# Patient Record
Sex: FEMALE | Race: WHITE
[De-identification: names, ages, dates, MRNs, and addresses within clinical notes are randomized per-mention and may not be internally consistent; named-entity substitution may affect disease eponyms.]

---

## 2018-01-24 ENCOUNTER — HOSPITAL ENCOUNTER (INPATIENT)
Dept: HOSPITAL 53 - M PCU | Age: 68
LOS: 2 days | Discharge: HOME | DRG: 194 | End: 2018-01-26
Attending: INTERNAL MEDICINE | Admitting: GENERAL PRACTICE
Payer: MEDICARE

## 2018-01-24 DIAGNOSIS — K44.9: ICD-10-CM

## 2018-01-24 DIAGNOSIS — I10: ICD-10-CM

## 2018-01-24 DIAGNOSIS — Z87.891: ICD-10-CM

## 2018-01-24 DIAGNOSIS — E03.9: ICD-10-CM

## 2018-01-24 DIAGNOSIS — G47.33: ICD-10-CM

## 2018-01-24 DIAGNOSIS — E66.01: ICD-10-CM

## 2018-01-24 DIAGNOSIS — J45.901: ICD-10-CM

## 2018-01-24 DIAGNOSIS — E11.9: ICD-10-CM

## 2018-01-24 DIAGNOSIS — E78.5: ICD-10-CM

## 2018-01-24 DIAGNOSIS — J10.1: Primary | ICD-10-CM

## 2018-01-24 DIAGNOSIS — Z79.899: ICD-10-CM

## 2018-01-24 DIAGNOSIS — Z88.2: ICD-10-CM

## 2018-01-24 DIAGNOSIS — I48.0: ICD-10-CM

## 2018-01-24 DIAGNOSIS — Z88.5: ICD-10-CM

## 2018-01-24 DIAGNOSIS — Z88.8: ICD-10-CM

## 2018-01-24 LAB
ABG BASE EXCESS: 0.3 (ref -2–2)
ABG HCO3: 24 MEQ/L (ref 22–26)
ABG O2 SATURATION: 99.5 % (ref 95–99)
ABG PARTIAL PRESSURE CO2: 35.5 MMHG (ref 35–45)
ABG PARTIAL PRESSURE O2: 266.4 MMHG (ref 75–100)
ABG PH (ARTERIAL): 7.45 UNITS (ref 7.35–7.45)
ABG STANDARD HCO3: 24.8 MEQ/L (ref 22–26)
ABG TOTAL CO2: 25 MEQ/L (ref 23–31)
ALBUMIN/GLOBULIN RATIO: 0.95 (ref 1–1.93)
ALBUMIN: 3.7 GM/DL (ref 3.2–5.2)
ALKALINE PHOSPHATASE: 48 U/L (ref 45–117)
ALT SERPL W P-5'-P-CCNC: 39 U/L (ref 12–78)
ANION GAP: 8 MEQ/L (ref 8–16)
AST SERPL-CCNC: 41 U/L (ref 7–37)
BASO #: 0.1 10^3/UL (ref 0–0.2)
BASO %: 0.9 % (ref 0–1)
BILIRUB CONJ SERPL-MCNC: < 0.1 MG/DL (ref 0–0.2)
BILIRUBIN,TOTAL: 0.1 MG/DL (ref 0.2–1)
BLOOD UREA NITROGEN: 15 MG/DL (ref 7–18)
CALCIUM LEVEL: 8.5 MG/DL (ref 8.8–10.2)
CARBON DIOXIDE LEVEL: 25 MEQ/L (ref 21–32)
CHLORIDE LEVEL: 105 MEQ/L (ref 98–107)
CK MB CFR.DF SERPL CALC: 0.37
CK MB CFR.DF SERPL CALC: 0.59
CK SERPL-CCNC: 217 U/L (ref 26–192)
CK SERPL-CCNC: 266 U/L (ref 26–192)
CK-MB VALUE MASS: 1 NG/ML (ref 0–3.6)
CK-MB VALUE MASS: 1.3 NG/ML (ref 0–3.6)
CREATININE FOR GFR: 0.86 MG/DL (ref 0.55–1.02)
EOS #: 0.1 10^3/UL (ref 0–0.5)
EOSINOPHIL NFR BLD AUTO: 1.4 % (ref 0–3)
FREE T4: 1.36 NG/DL (ref 0.76–1.46)
GFR SERPL CREATININE-BSD FRML MDRD: > 60 ML/MIN/{1.73_M2} (ref 45–?)
GLUCOSE BLDC GLUCOMTR-MCNC: 223 MG/DL (ref 80–115)
GLUCOSE, FASTING: 147 MG/DL (ref 70–100)
HEMATOCRIT: 37.2 % (ref 36–47)
HEMOGLOBIN: 12.6 G/DL (ref 12–16)
IMMATURE GRANULOCYTE #: 0 10^3/UL (ref 0–0)
IMMATURE GRANULOCYTE %: 0.3 % (ref 0–0)
INR: 1.04
LIPASE: 125 U/L (ref 73–393)
LYMPH #: 1.5 10^3/UL (ref 1.5–4.5)
LYMPH %: 22.2 % (ref 24–44)
MAGNESIUM LEVEL: 2.2 MG/DL (ref 1.8–2.4)
MEAN CORPUSCULAR HEMOGLOBIN: 28.3 PG (ref 27–33)
MEAN CORPUSCULAR HGB CONC: 33.9 G/DL (ref 32–36.5)
MEAN CORPUSCULAR VOLUME: 83.4 FL (ref 80–96)
MONO #: 1.1 10^3/UL (ref 0–0.8)
MONO %: 15.9 % (ref 0–5)
NEUTROPHILS #: 4.1 10^3/UL (ref 1.8–7.7)
NEUTROPHILS %: 59.3 % (ref 36–66)
NRBC BLD AUTO-RTO: 0 % (ref 0–0)
NT-PRO BNP: 269 PG/ML (ref ?–125)
PARTIAL THROMBOPLASTIN TIME: 32.4 SECONDS (ref 26.8–37.9)
PHOSPHORUS LEVEL: 3 MG/DL (ref 2.5–4.9)
PLATELET COUNT, AUTOMATED: 264 10^3/UL (ref 150–450)
POTASSIUM SERUM: 4.2 MEQ/L (ref 3.5–5.1)
PROTHROMBIN TIME: 13.7 SECONDS (ref 12.4–14.5)
RED BLOOD COUNT: 4.46 10^6/UL (ref 4–5.4)
RED CELL DISTRIBUTION WIDTH: 14.4 % (ref 11.5–14.5)
SODIUM LEVEL: 138 MEQ/L (ref 136–145)
THYROID STIMULATING HORMONE: 1.26 UIU/ML (ref 0.36–3.74)
TOTAL PROTEIN: 7.6 GM/DL (ref 6.4–8.2)
TROPONIN I: < 0.02 NG/ML (ref ?–0.1)
TROPONIN I: < 0.02 NG/ML (ref ?–0.1)
WHITE BLOOD COUNT: 6.9 10^3/UL (ref 4–10)

## 2018-01-24 RX ADMIN — ACETAMINOPHEN 1 MG: 325 TABLET ORAL at 22:01

## 2018-01-24 RX ADMIN — IPRATROPIUM BROMIDE AND ALBUTEROL SULFATE 1 ML: .5; 3 SOLUTION RESPIRATORY (INHALATION) at 16:52

## 2018-01-24 RX ADMIN — RIVAROXABAN 1 MG: 20 TABLET, FILM COATED ORAL at 23:24

## 2018-01-24 RX ADMIN — METHYLPREDNISOLONE SODIUM SUCCINATE 1 MG: 125 INJECTION, POWDER, FOR SOLUTION INTRAMUSCULAR; INTRAVENOUS at 17:19

## 2018-01-24 RX ADMIN — INSULIN LISPRO 1 UNITS: 100 INJECTION, SOLUTION INTRAVENOUS; SUBCUTANEOUS at 21:00

## 2018-01-24 RX ADMIN — FUROSEMIDE 1 MG: 10 INJECTION, SOLUTION INTRAMUSCULAR; INTRAVENOUS at 23:52

## 2018-01-24 RX ADMIN — ASPIRIN 325 MG ORAL TABLET 1 MG: 325 PILL ORAL at 18:35

## 2018-01-24 RX ADMIN — IPRATROPIUM BROMIDE AND ALBUTEROL SULFATE 1 ML: .5; 3 SOLUTION RESPIRATORY (INHALATION) at 21:34

## 2018-01-25 LAB
ANION GAP: 10 MEQ/L (ref 8–16)
BASO #: 0 10^3/UL (ref 0–0.2)
BASO %: 0.1 % (ref 0–1)
BLOOD UREA NITROGEN: 18 MG/DL (ref 7–18)
CALCIUM LEVEL: 8.9 MG/DL (ref 8.8–10.2)
CARBON DIOXIDE LEVEL: 23 MEQ/L (ref 21–32)
CHLORIDE LEVEL: 104 MEQ/L (ref 98–107)
CK MB CFR.DF SERPL CALC: 0.57
CK MB CFR.DF SERPL CALC: 0.74
CK SERPL-CCNC: 174 U/L (ref 26–192)
CK SERPL-CCNC: 189 U/L (ref 26–192)
CK-MB VALUE MASS: 1 NG/ML (ref 0–3.6)
CK-MB VALUE MASS: 1.4 NG/ML (ref 0–3.6)
CREATININE FOR GFR: 0.87 MG/DL (ref 0.55–1.02)
EOS #: 0 10^3/UL (ref 0–0.5)
EOSINOPHIL NFR BLD AUTO: 0 % (ref 0–3)
EST. AVERAGE GLUCOSE BLD GHB EST-MCNC: 146 MG/DL (ref 60–110)
GFR SERPL CREATININE-BSD FRML MDRD: > 60 ML/MIN/{1.73_M2} (ref 45–?)
GLUCOSE BLDC GLUCOMTR-MCNC: 171 MG/DL (ref 80–115)
GLUCOSE BLDC GLUCOMTR-MCNC: 172 MG/DL (ref 80–115)
GLUCOSE BLDC GLUCOMTR-MCNC: 198 MG/DL (ref 80–115)
GLUCOSE, FASTING: 211 MG/DL (ref 70–100)
HEMATOCRIT: 38 % (ref 36–47)
HEMOGLOBIN: 12.6 G/DL (ref 12–16)
IMMATURE GRANULOCYTE #: 0.1 10^3/UL (ref 0–0)
IMMATURE GRANULOCYTE %: 0.8 % (ref 0–0)
LYMPH #: 0.9 10^3/UL (ref 1.5–4.5)
LYMPH %: 12.9 % (ref 24–44)
MEAN CORPUSCULAR HEMOGLOBIN: 27.6 PG (ref 27–33)
MEAN CORPUSCULAR HGB CONC: 33.2 G/DL (ref 32–36.5)
MEAN CORPUSCULAR VOLUME: 83.2 FL (ref 80–96)
MONO #: 0.1 10^3/UL (ref 0–0.8)
MONO %: 1.4 % (ref 0–5)
NEUTROPHILS #: 6.1 10^3/UL (ref 1.8–7.7)
NEUTROPHILS %: 84.8 % (ref 36–66)
NRBC BLD AUTO-RTO: 0 % (ref 0–0)
PLATELET COUNT, AUTOMATED: 274 10^3/UL (ref 150–450)
POTASSIUM SERUM: 3.9 MEQ/L (ref 3.5–5.1)
RED BLOOD COUNT: 4.57 10^6/UL (ref 4–5.4)
RED CELL DISTRIBUTION WIDTH: 14.5 % (ref 11.5–14.5)
SODIUM LEVEL: 137 MEQ/L (ref 136–145)
TROPONIN I: < 0.02 NG/ML (ref ?–0.1)
TROPONIN I: < 0.02 NG/ML (ref ?–0.1)
WHITE BLOOD COUNT: 7.2 10^3/UL (ref 4–10)

## 2018-01-25 RX ADMIN — LEVALBUTEROL HYDROCHLORIDE 1 MG: 1.25 SOLUTION, CONCENTRATE RESPIRATORY (INHALATION) at 00:58

## 2018-01-25 RX ADMIN — LEVALBUTEROL HYDROCHLORIDE 1 MG: 1.25 SOLUTION, CONCENTRATE RESPIRATORY (INHALATION) at 20:00

## 2018-01-25 RX ADMIN — OSELTAMIVIR PHOSPHATE 1 MG: 30 CAPSULE ORAL at 20:43

## 2018-01-25 RX ADMIN — METHYLPREDNISOLONE SODIUM SUCCINATE 1 MG: 40 INJECTION, POWDER, FOR SOLUTION INTRAMUSCULAR; INTRAVENOUS at 13:00

## 2018-01-25 RX ADMIN — INSULIN LISPRO 4 UNITS: 100 INJECTION, SOLUTION INTRAVENOUS; SUBCUTANEOUS at 18:08

## 2018-01-25 RX ADMIN — INSULIN LISPRO 4 UNITS: 100 INJECTION, SOLUTION INTRAVENOUS; SUBCUTANEOUS at 12:00

## 2018-01-25 RX ADMIN — FUROSEMIDE 1 MG: 20 TABLET ORAL at 10:25

## 2018-01-25 RX ADMIN — LOSARTAN POTASSIUM 1 MG: 50 TABLET, FILM COATED ORAL at 10:26

## 2018-01-25 RX ADMIN — BUDESONIDE AND FORMOTEROL FUMARATE DIHYDRATE 1 PUFF: 80; 4.5 AEROSOL RESPIRATORY (INHALATION) at 21:22

## 2018-01-25 RX ADMIN — LEVALBUTEROL HYDROCHLORIDE 1 MG: 1.25 SOLUTION, CONCENTRATE RESPIRATORY (INHALATION) at 08:18

## 2018-01-25 RX ADMIN — INSULIN LISPRO 1 UNITS: 100 INJECTION, SOLUTION INTRAVENOUS; SUBCUTANEOUS at 20:47

## 2018-01-25 RX ADMIN — OMEPRAZOLE 1 MG: 20 CAPSULE, DELAYED RELEASE ORAL at 10:26

## 2018-01-25 RX ADMIN — BUDESONIDE AND FORMOTEROL FUMARATE DIHYDRATE 1 PUFF: 80; 4.5 AEROSOL RESPIRATORY (INHALATION) at 10:03

## 2018-01-25 RX ADMIN — ASPIRIN 1 MG: 81 TABLET ORAL at 10:25

## 2018-01-25 RX ADMIN — Medication 1 EA: at 10:24

## 2018-01-25 RX ADMIN — OSELTAMIVIR PHOSPHATE 1 MG: 30 CAPSULE ORAL at 09:00

## 2018-01-25 RX ADMIN — PRAVASTATIN SODIUM 1 MG: 20 TABLET ORAL at 20:43

## 2018-01-25 RX ADMIN — METHYLPREDNISOLONE SODIUM SUCCINATE 1 MG: 125 INJECTION, POWDER, FOR SOLUTION INTRAMUSCULAR; INTRAVENOUS at 10:00

## 2018-01-25 RX ADMIN — INSULIN LISPRO 6 UNITS: 100 INJECTION, SOLUTION INTRAVENOUS; SUBCUTANEOUS at 10:23

## 2018-01-25 RX ADMIN — Medication 1 EA: at 20:43

## 2018-01-25 RX ADMIN — RIVAROXABAN 1 MG: 20 TABLET, FILM COATED ORAL at 20:43

## 2018-01-25 RX ADMIN — LEVOTHYROXINE SODIUM 1 MCG: 150 TABLET ORAL at 06:38

## 2018-01-25 RX ADMIN — ALLOPURINOL 1 MG: 100 TABLET ORAL at 10:24

## 2018-01-25 RX ADMIN — METHYLPREDNISOLONE SODIUM SUCCINATE 1 MG: 125 INJECTION, POWDER, FOR SOLUTION INTRAMUSCULAR; INTRAVENOUS at 02:12

## 2018-01-26 LAB
ANION GAP: 9 MEQ/L (ref 8–16)
BASO #: 0 10^3/UL (ref 0–0.2)
BASO %: 0.1 % (ref 0–1)
BLOOD UREA NITROGEN: 24 MG/DL (ref 7–18)
CALCIUM LEVEL: 8.9 MG/DL (ref 8.8–10.2)
CARBON DIOXIDE LEVEL: 24 MEQ/L (ref 21–32)
CHLORIDE LEVEL: 106 MEQ/L (ref 98–107)
CREATININE FOR GFR: 0.86 MG/DL (ref 0.55–1.02)
EOS #: 0 10^3/UL (ref 0–0.5)
EOSINOPHIL NFR BLD AUTO: 0 % (ref 0–3)
GFR SERPL CREATININE-BSD FRML MDRD: > 60 ML/MIN/{1.73_M2} (ref 45–?)
GLUCOSE, FASTING: 204 MG/DL (ref 70–100)
HEMATOCRIT: 37.9 % (ref 36–47)
HEMOGLOBIN: 12.5 G/DL (ref 12–16)
IMMATURE GRANULOCYTE #: 0.2 10^3/UL (ref 0–0)
IMMATURE GRANULOCYTE %: 0.9 % (ref 0–0)
LYMPH #: 1.3 10^3/UL (ref 1.5–4.5)
LYMPH %: 7.2 % (ref 24–44)
MEAN CORPUSCULAR HEMOGLOBIN: 27.7 PG (ref 27–33)
MEAN CORPUSCULAR HGB CONC: 33 G/DL (ref 32–36.5)
MEAN CORPUSCULAR VOLUME: 83.8 FL (ref 80–96)
MONO #: 0.6 10^3/UL (ref 0–0.8)
MONO %: 3.3 % (ref 0–5)
NEUTROPHILS #: 16.1 10^3/UL (ref 1.8–7.7)
NEUTROPHILS %: 88.5 % (ref 36–66)
NRBC BLD AUTO-RTO: 0 % (ref 0–0)
PLATELET COUNT, AUTOMATED: 283 10^3/UL (ref 150–450)
POTASSIUM SERUM: 4.3 MEQ/L (ref 3.5–5.1)
RED BLOOD COUNT: 4.52 10^6/UL (ref 4–5.4)
RED CELL DISTRIBUTION WIDTH: 14.7 % (ref 11.5–14.5)
SODIUM LEVEL: 139 MEQ/L (ref 136–145)
WHITE BLOOD COUNT: 18.2 10^3/UL (ref 4–10)

## 2018-01-26 RX ADMIN — ALLOPURINOL 1 MG: 100 TABLET ORAL at 08:09

## 2018-01-26 RX ADMIN — LEVALBUTEROL HYDROCHLORIDE 1 MG: 1.25 SOLUTION, CONCENTRATE RESPIRATORY (INHALATION) at 01:46

## 2018-01-26 RX ADMIN — OMEPRAZOLE 1 MG: 20 CAPSULE, DELAYED RELEASE ORAL at 08:09

## 2018-01-26 RX ADMIN — OSELTAMIVIR PHOSPHATE 1 MG: 30 CAPSULE ORAL at 08:09

## 2018-01-26 RX ADMIN — METHYLPREDNISOLONE SODIUM SUCCINATE 1 MG: 40 INJECTION, POWDER, FOR SOLUTION INTRAMUSCULAR; INTRAVENOUS at 00:20

## 2018-01-26 RX ADMIN — LEVOTHYROXINE SODIUM 1 MCG: 150 TABLET ORAL at 05:39

## 2018-01-26 RX ADMIN — ASPIRIN 1 MG: 81 TABLET ORAL at 08:08

## 2018-01-26 RX ADMIN — INSULIN LISPRO 6 UNITS: 100 INJECTION, SOLUTION INTRAVENOUS; SUBCUTANEOUS at 08:17

## 2018-01-26 RX ADMIN — BUDESONIDE AND FORMOTEROL FUMARATE DIHYDRATE 1 PUFF: 80; 4.5 AEROSOL RESPIRATORY (INHALATION) at 08:21

## 2018-01-26 RX ADMIN — FUROSEMIDE 1 MG: 20 TABLET ORAL at 08:09

## 2018-01-26 RX ADMIN — Medication 1 EA: at 08:08

## 2018-01-26 RX ADMIN — LOSARTAN POTASSIUM 1 MG: 50 TABLET, FILM COATED ORAL at 08:15

## 2018-01-26 RX ADMIN — LEVALBUTEROL HYDROCHLORIDE 1 MG: 1.25 SOLUTION, CONCENTRATE RESPIRATORY (INHALATION) at 08:00

## 2018-04-20 ENCOUNTER — HOSPITAL ENCOUNTER (OUTPATIENT)
Dept: HOSPITAL 53 - M SMT | Age: 68
End: 2018-04-20
Attending: ALLERGY & IMMUNOLOGY
Payer: MEDICARE
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
## 2018-04-20 DIAGNOSIS — T78.3XXA: Primary | ICD-10-CM

## 2018-04-20 LAB
ALBUMIN/GLOBULIN RATIO: 1.03 (ref 1–1.93)
ALBUMIN: 3.8 GM/DL (ref 3.2–5.2)
ALKALINE PHOSPHATASE: 47 U/L (ref 45–117)
ALT SERPL W P-5'-P-CCNC: 42 U/L (ref 12–78)
ANION GAP: 13 MEQ/L (ref 8–16)
AST SERPL-CCNC: 29 U/L (ref 7–37)
BILIRUBIN,TOTAL: 0.2 MG/DL (ref 0.2–1)
BLOOD UREA NITROGEN: 15 MG/DL (ref 7–18)
CALCIUM LEVEL: 9.1 MG/DL (ref 8.8–10.2)
CARBON DIOXIDE LEVEL: 21 MEQ/L (ref 21–32)
CHLORIDE LEVEL: 108 MEQ/L (ref 98–107)
COMPLEMENT C4: 27.5 MG/DL (ref 10–40)
CREATININE FOR GFR: 0.85 MG/DL (ref 0.55–1.3)
ERYTHROCYTE SEDIMENTATION RATE: 23 MM/HR (ref 0–30)
GFR SERPL CREATININE-BSD FRML MDRD: > 60 ML/MIN/{1.73_M2} (ref 45–?)
GLUCOSE, FASTING: 106 MG/DL (ref 70–100)
POTASSIUM SERUM: 4 MEQ/L (ref 3.5–5.1)
RHEUMATOID FACTOR QUANT: < 10 IU/ML (ref ?–15)
SODIUM LEVEL: 142 MEQ/L (ref 136–145)
THYROGLOBULIN ANTIBODY: 44.2 U/ML (ref ?–60)
THYROPEROXIDASE AB SERPL IA-ACNC: 1054.7 U/ML (ref ?–60)
TOTAL PROTEIN: 7.5 GM/DL (ref 6.4–8.2)

## 2018-04-20 PROCEDURE — 80053 COMPREHEN METABOLIC PANEL: CPT

## 2020-03-21 ENCOUNTER — HOSPITAL ENCOUNTER (OUTPATIENT)
Dept: HOSPITAL 53 - M LAB REF | Age: 70
End: 2020-03-21
Attending: INTERNAL MEDICINE
Payer: MEDICARE

## 2020-03-21 DIAGNOSIS — D47.2: Primary | ICD-10-CM

## 2020-05-08 ENCOUNTER — HOSPITAL ENCOUNTER (OUTPATIENT)
Dept: HOSPITAL 53 - M LAB | Age: 70
End: 2020-05-08
Attending: INTERNAL MEDICINE
Payer: MEDICARE

## 2020-05-08 DIAGNOSIS — Z79.01: ICD-10-CM

## 2020-05-08 DIAGNOSIS — D47.2: Primary | ICD-10-CM

## 2020-05-08 NOTE — REP
REASON FOR EXAM:  Monoclonal gammopathy.

 

There are no priors for comparison.

 

AP and lateral views of the skull show no evidence of a lytic or blastic osseous

lesion.  AP and lateral views of the cervical spine show marked degenerative

changes with hypertrophic degenerative change seen involving the facet and

uncovertebral joint at every level bilaterally.  In addition, there is disc space

narrowing along with anterior and posterior osteophytic ridging at every level.

There is a 2 mm anterolisthesis of C3 on C4 which is likely chronic.

 

AP and lateral views of the thoracic spine show syndesmophyte formation along the

right side of the thoracic spine from T5-6 to T12-L1 and left-sided syndesmophyte

formation seen at T11-12 and T12-L1.  The pedicles appear to be intact

bilaterally.  There is degenerative disc space narrowing at every level.

Vertebral body height and alignment is within normal limits.  No definite lytic

or blastic osseous lesions are noted.

 

AP and lateral views of the lumbar spine show a grade 1 L4 upon L5

spondylolisthesis likely secondary to marked degenerative facet joint changes,

which are seen bilaterally at every level and particularly at L4-5 and L5-S1.

Vertebral body height is within normal limits.  There is posterior disc space

narrowing at every level.  The pedicles appear to be intact bilaterally.  There

is no evidence of a lytic or blastic osseous lesion.

 

AP pelvis shows bilateral hip degenerative changes. There is no evidence of a

lytic or blastic osseous lesion.  Chronic changes are also seen involving the

pubic symphysis.

 

AP examination of the femur bilateral:  No lytic or blastic osseous lesions.

 

AP examination of the humerus bilateral:  No lytic or blastic osseous lesions.

 

IMPRESSION:

 

Advanced chronic changes as described above.  No lytic or blastic osseous lesions

are seen.

 

 

Electronically Signed by

Norm Morales DO 05/08/2020 01:26 P

## 2020-08-05 ENCOUNTER — HOSPITAL ENCOUNTER (OUTPATIENT)
Dept: HOSPITAL 53 - M LAB | Age: 70
End: 2020-08-05
Attending: INTERNAL MEDICINE
Payer: MEDICARE

## 2020-08-05 DIAGNOSIS — D47.2: Primary | ICD-10-CM

## 2020-09-19 LAB
ALBUMIN SERPL BCG-MCNC: 3.6 GM/DL (ref 3.2–5.2)
ALT SERPL W P-5'-P-CCNC: 33 U/L (ref 12–78)
BILIRUB SERPL-MCNC: 0.3 MG/DL (ref 0.2–1)
BUN SERPL-MCNC: 13 MG/DL (ref 7–18)
CALCIUM SERPL-MCNC: 9.1 MG/DL (ref 8.8–10.2)
CHLORIDE SERPL-SCNC: 105 MEQ/L (ref 98–107)
CO2 SERPL-SCNC: 25 MEQ/L (ref 21–32)
CREAT SERPL-MCNC: 0.94 MG/DL (ref 0.55–1.3)
GFR SERPL CREATININE-BSD FRML MDRD: > 60 ML/MIN/{1.73_M2} (ref 39–?)
GLUCOSE SERPL-MCNC: 138 MG/DL (ref 70–100)
IGA SERPL-MCNC: 354 MG/DL (ref 70–400)
IGG SERPL-MCNC: 1290 MG/DL (ref 681–1648)
IGM SERPL-MCNC: 183 MG/DL (ref 40–230)
POTASSIUM SERPL-SCNC: 4.4 MEQ/L (ref 3.5–5.1)
PROT SERPL-MCNC: 7.7 GM/DL (ref 6.4–8.2)
SODIUM SERPL-SCNC: 137 MEQ/L (ref 136–145)

## 2020-09-22 LAB
KAPPA LC FREE SER-MCNC: (no result) MG/L
KAPPA LC/LAMBDA SER: (no result) {RATIO}
LAMBDA LC FREE SERPL-MCNC: (no result) MG/L

## 2020-10-02 LAB
BASOPHILS # BLD AUTO: 0.1 10^3/UL (ref 0–0.2)
BASOPHILS NFR BLD AUTO: 0.9 % (ref 0–1)
EOSINOPHIL # BLD AUTO: 0.2 10^3/UL (ref 0–0.5)
EOSINOPHIL NFR BLD AUTO: 2.4 % (ref 0–3)
HCT VFR BLD AUTO: 41.5 % (ref 36–47)
HGB BLD-MCNC: 13.1 G/DL (ref 12–15.5)
LYMPHOCYTES # BLD AUTO: 3.2 10^3/UL (ref 1.5–5)
LYMPHOCYTES NFR BLD AUTO: 34.5 % (ref 24–44)
MCH RBC QN AUTO: 28.4 PG (ref 27–33)
MCHC RBC AUTO-ENTMCNC: 31.6 G/DL (ref 32–36.5)
MCV RBC AUTO: 90 FL (ref 80–96)
MONOCYTES # BLD AUTO: 0.8 10^3/UL (ref 0–0.8)
MONOCYTES NFR BLD AUTO: 9.2 % (ref 0–5)
NEUTROPHILS # BLD AUTO: 4.8 10^3/UL (ref 1.5–8.5)
NEUTROPHILS NFR BLD AUTO: 52.1 % (ref 36–66)
PLATELET # BLD AUTO: 277 10^3/UL (ref 150–450)
RBC # BLD AUTO: 4.61 10^6/UL (ref 4–5.4)
WBC # BLD AUTO: 9.1 10^3/UL (ref 4–10)

## 2021-01-19 ENCOUNTER — HOSPITAL ENCOUNTER (OUTPATIENT)
Dept: HOSPITAL 53 - M SLEEP | Age: 71
End: 2021-01-19
Attending: NURSE PRACTITIONER
Payer: MEDICARE

## 2021-01-19 DIAGNOSIS — G47.33: Primary | ICD-10-CM

## 2021-01-20 NOTE — SLEEPCENT
NOCTURNAL POLYSOMNOGRAPHY

 

DATE: 01/19/2021



ORDERED BY: Kimmie Contreras NP 



Nocturnal polysomnography was performed for re-titration of pressure therapy in

this patient with a history of obstructive sleep apnea syndrome, who remains

fatigued and somnolent despite use of CPAP at a pressure of 13 cm. 



For testing a ResMed AirFit F20 full face mask of medium size was used, 13 cm

of water pressure was initially applied to the circuit, and the lights were

extinguished. 



8 hours and 17 minutes of data were reviewed. There were 142.5 minutes of sleep

identified. Sleep latency was prolonged at 82 minutes. The patient did not

achieve REM sleep. Sleep architecture was poor with a prolonged period of wake

between 1 and 3 a.m. Overall sleep efficiency was therefore reduced at only

29.9%. The patient's electrocardiogram showed a sinus rhythm with an average

heart rate of 60 beats per minute. Rate range 56 to 64. Unifocal ventricular

and atrial ectopic beats were appreciated. EEG showed some coarsening in

background. Some EKG artifact is appreciated. Waveforms for sleep stages were

reasonably normal and there were no focal events. Significant respiratory

events were not seen on CPAP at a pressure of 13. A brief trial was given at a

pressure of 10, the patient did not sleep during this interval. There were a

few limb movements noted, but no trains of events. Limb movement arousal index

was only 2.1. Oxygen saturations remained reasonably normal throughout the

study. 



IMPRESSION:

Obstructive sleep apnea syndrome (G47.33). 



RECOMMENDATION:

Based on the results of this study, a change in CPAP pressure is not

recommended. The patient appears to be palliated at a CPAP pressure of 13.

Sleep in the lab was less than optimal, however, and other causes for the

patient's daytime symptoms may need to be pursued. 







J. Rounds

## 2022-05-25 ENCOUNTER — HOSPITAL ENCOUNTER (EMERGENCY)
Dept: HOSPITAL 53 - M ED | Age: 72
Discharge: HOME | End: 2022-05-25
Payer: MEDICARE

## 2022-05-25 VITALS — BODY MASS INDEX: 49.45 KG/M2 | WEIGHT: 279.11 LBS | HEIGHT: 63 IN

## 2022-05-25 VITALS — SYSTOLIC BLOOD PRESSURE: 174 MMHG | DIASTOLIC BLOOD PRESSURE: 77 MMHG

## 2022-05-25 DIAGNOSIS — Z88.5: ICD-10-CM

## 2022-05-25 DIAGNOSIS — Z79.82: ICD-10-CM

## 2022-05-25 DIAGNOSIS — E03.9: ICD-10-CM

## 2022-05-25 DIAGNOSIS — R04.2: ICD-10-CM

## 2022-05-25 DIAGNOSIS — Z88.8: ICD-10-CM

## 2022-05-25 DIAGNOSIS — I48.91: ICD-10-CM

## 2022-05-25 DIAGNOSIS — Z79.84: ICD-10-CM

## 2022-05-25 DIAGNOSIS — Z91.89: ICD-10-CM

## 2022-05-25 DIAGNOSIS — Z98.61: ICD-10-CM

## 2022-05-25 DIAGNOSIS — J18.9: Primary | ICD-10-CM

## 2022-05-25 DIAGNOSIS — Z79.899: ICD-10-CM

## 2022-05-25 DIAGNOSIS — Z88.2: ICD-10-CM

## 2022-05-25 DIAGNOSIS — K21.9: ICD-10-CM

## 2022-05-25 DIAGNOSIS — Z82.49: ICD-10-CM

## 2022-05-25 DIAGNOSIS — E11.9: ICD-10-CM

## 2022-05-25 DIAGNOSIS — J45.909: ICD-10-CM

## 2022-05-25 DIAGNOSIS — I10: ICD-10-CM

## 2022-05-25 LAB
APTT BLD: 34.4 SECONDS (ref 25.9–37)
BASOPHILS # BLD AUTO: 0.1 10^3/UL (ref 0–0.2)
BASOPHILS NFR BLD AUTO: 0.8 % (ref 0–1)
CK MB CFR.DF SERPL CALC: 1.52
CK MB CFR.DF SERPL CALC: 1.61
CK MB SERPL-MCNC: < 1 NG/ML (ref ?–3.6)
CK MB SERPL-MCNC: < 1 NG/ML (ref ?–3.6)
CK SERPL-CCNC: 62 U/L (ref 26–192)
CK SERPL-CCNC: 66 U/L (ref 26–192)
EOSINOPHIL # BLD AUTO: 0.3 10^3/UL (ref 0–0.5)
EOSINOPHIL NFR BLD AUTO: 2.4 % (ref 0–3)
HCT VFR BLD AUTO: 36.9 % (ref 36–47)
HGB BLD-MCNC: 12.1 G/DL (ref 12–15.5)
INR PPP: 0.99
LYMPHOCYTES # BLD AUTO: 3.5 10^3/UL (ref 1.5–5)
LYMPHOCYTES NFR BLD AUTO: 31.8 % (ref 24–44)
MCH RBC QN AUTO: 28.6 PG (ref 27–33)
MCHC RBC AUTO-ENTMCNC: 32.8 G/DL (ref 32–36.5)
MCV RBC AUTO: 87.2 FL (ref 80–96)
MONOCYTES # BLD AUTO: 0.9 10^3/UL (ref 0–0.8)
MONOCYTES NFR BLD AUTO: 7.9 % (ref 2–8)
NEUTROPHILS # BLD AUTO: 6.1 10^3/UL (ref 1.5–8.5)
NEUTROPHILS NFR BLD AUTO: 56.3 % (ref 36–66)
PLATELET # BLD AUTO: 282 10^3/UL (ref 150–450)
PROTHROMBIN TIME: 13.5 SECONDS (ref 12.7–14.5)
RBC # BLD AUTO: 4.23 10^6/UL (ref 4–5.4)
WBC # BLD AUTO: 10.9 10^3/UL (ref 4–10)

## 2022-05-25 PROCEDURE — 85610 PROTHROMBIN TIME: CPT

## 2022-05-25 PROCEDURE — 85730 THROMBOPLASTIN TIME PARTIAL: CPT

## 2022-05-25 PROCEDURE — 71275 CT ANGIOGRAPHY CHEST: CPT

## 2022-05-25 PROCEDURE — 82550 ASSAY OF CK (CPK): CPT

## 2022-05-25 PROCEDURE — 93005 ELECTROCARDIOGRAM TRACING: CPT

## 2022-05-25 PROCEDURE — 87581 M.PNEUMON DNA AMP PROBE: CPT

## 2022-05-25 PROCEDURE — 87633 RESP VIRUS 12-25 TARGETS: CPT

## 2022-05-25 PROCEDURE — 87486 CHLMYD PNEUM DNA AMP PROBE: CPT

## 2022-05-25 PROCEDURE — 87798 DETECT AGENT NOS DNA AMP: CPT

## 2022-05-25 PROCEDURE — 82553 CREATINE MB FRACTION: CPT

## 2022-05-25 PROCEDURE — 85025 COMPLETE CBC W/AUTO DIFF WBC: CPT

## 2022-05-25 PROCEDURE — 80047 BASIC METABLC PNL IONIZED CA: CPT

## 2022-05-25 PROCEDURE — 71046 X-RAY EXAM CHEST 2 VIEWS: CPT

## 2022-05-25 PROCEDURE — 84484 ASSAY OF TROPONIN QUANT: CPT

## 2022-05-25 PROCEDURE — 99284 EMERGENCY DEPT VISIT MOD MDM: CPT
